# Patient Record
Sex: MALE | Race: WHITE | HISPANIC OR LATINO | Employment: UNEMPLOYED | URBAN - METROPOLITAN AREA
[De-identification: names, ages, dates, MRNs, and addresses within clinical notes are randomized per-mention and may not be internally consistent; named-entity substitution may affect disease eponyms.]

---

## 2024-11-15 ENCOUNTER — HOSPITAL ENCOUNTER (EMERGENCY)
Facility: HOSPITAL | Age: 19
Discharge: HOME | End: 2024-11-15
Attending: EMERGENCY MEDICINE
Payer: COMMERCIAL

## 2024-11-15 VITALS
OXYGEN SATURATION: 98 % | WEIGHT: 154.54 LBS | RESPIRATION RATE: 20 BRPM | DIASTOLIC BLOOD PRESSURE: 68 MMHG | TEMPERATURE: 100.3 F | BODY MASS INDEX: 25.75 KG/M2 | SYSTOLIC BLOOD PRESSURE: 138 MMHG | HEART RATE: 108 BPM | HEIGHT: 65 IN

## 2024-11-15 DIAGNOSIS — J06.9 URI (UPPER RESPIRATORY INFECTION): Primary | ICD-10-CM

## 2024-11-15 LAB — S PYO DNA THROAT QL NAA+PROBE: NOT DETECTED

## 2024-11-15 PROCEDURE — 87651 STREP A DNA AMP PROBE: CPT | Performed by: EMERGENCY MEDICINE

## 2024-11-15 PROCEDURE — 99283 EMERGENCY DEPT VISIT LOW MDM: CPT

## 2024-11-15 PROCEDURE — 99283 EMERGENCY DEPT VISIT LOW MDM: CPT | Performed by: EMERGENCY MEDICINE

## 2024-11-16 NOTE — ED PROVIDER NOTES
Time reflects when diagnosis was documented in both MDM as applicable and the Disposition within this note       Time User Action Codes Description Comment    11/15/2024 10:22 PM Garrett Good Add [J06.9] URI (upper respiratory infection)           ED Disposition       ED Disposition   Discharge    Condition   Stable    Date/Time   Fri Nov 15, 2024 10:22 PM    Comment   Olvin Penny discharge to home/self care.                   Assessment & Plan       Medical Decision Making  Patient is a 19-year-old male, comes in with complaints of sore throat, cough, bilateral ear pain, going on for 1 week, concerned about possible strep exposure as brother was sick with that, no fever, no trouble swallowing, no dyspnea, no chest pain.  On exam, patient is conscious, alert, well-appearing, no acute distress, low-grade temp, no increased work of breathing, lungs clear, no wheezing, no oropharyngeal exudates, no erythema of TMs.  Impression: URI, strep throat, will check strep swab, discussed with patient that is likely URI, symptoms going on for almost a week, no indication for viral/flu swab at this time, patient okay with that.    Problems Addressed:  URI (upper respiratory infection): acute illness or injury    Amount and/or Complexity of Data Reviewed  Labs: ordered. Decision-making details documented in ED Course.        ED Course as of 11/15/24 2341   Fri Nov 15, 2024   2213 STREP A PCR: Not Detected  Strep negative.       Medications - No data to display    ED Risk Strat Scores                           SBIRT 20yo+      Flowsheet Row Most Recent Value   Initial Alcohol Screen: US AUDIT-C     1. How often do you have a drink containing alcohol? 0 Filed at: 11/15/2024 2112   2. How many drinks containing alcohol do you have on a typical day you are drinking?  0 Filed at: 11/15/2024 2112   3a. Male UNDER 65: How often do you have five or more drinks on one occasion? 0 Filed at: 11/15/2024 2112   Audit-C Score 0 Filed at:  11/15/2024 2112   PRATIMA: How many times in the past year have you...    Used an illegal drug or used a prescription medication for non-medical reasons? Never Filed at: 11/15/2024 2112                            History of Present Illness       Chief Complaint   Patient presents with    Sore Throat     Pt c/o sore throat and a dry cough for the past week. Denies fever, N,V.  Pts little brother has strep throat.        History reviewed. No pertinent past medical history.   History reviewed. No pertinent surgical history.   History reviewed. No pertinent family history.       E-Cigarette/Vaping      E-Cigarette/Vaping Substances      I have reviewed and agree with the history as documented.       History provided by:  Patient   used: No    Sore Throat  Location:  Generalized  Quality:  Aching  Severity:  Mild  Onset quality:  Gradual  Duration:  1 week  Timing:  Intermittent  Progression:  Waxing and waning  Chronicity:  New  Relieved by:  Nothing  Worsened by:  Nothing  Ineffective treatments:  None tried  Associated symptoms: cough and ear pain    Associated symptoms: no abdominal pain, no adenopathy, no chest pain, no chills, no fever, no headaches, no neck stiffness, no rash, no shortness of breath and no trouble swallowing    Cough:     Cough characteristics:  Non-productive    Sputum characteristics:  Nondescript    Severity:  Moderate    Onset quality:  Gradual    Duration:  1 week    Timing:  Intermittent    Progression:  Waxing and waning  Risk factors: exposure to strep        Review of Systems   Constitutional:  Negative for chills and fever.   HENT:  Positive for ear pain and sore throat. Negative for facial swelling and trouble swallowing.    Eyes:  Negative for pain and visual disturbance.   Respiratory:  Positive for cough. Negative for chest tightness and shortness of breath.    Cardiovascular:  Negative for chest pain and leg swelling.   Gastrointestinal:  Negative for abdominal  pain, blood in stool, diarrhea, nausea and vomiting.   Genitourinary:  Negative for dysuria and flank pain.   Musculoskeletal:  Negative for back pain, neck pain and neck stiffness.   Skin:  Negative for pallor and rash.   Allergic/Immunologic: Negative for environmental allergies and immunocompromised state.   Neurological:  Negative for dizziness and headaches.   Hematological:  Negative for adenopathy. Does not bruise/bleed easily.   Psychiatric/Behavioral:  Negative for agitation and behavioral problems.    All other systems reviewed and are negative.          Objective       ED Triage Vitals [11/15/24 2056]   Temperature Pulse Blood Pressure Respirations SpO2 Patient Position - Orthostatic VS   100.3 °F (37.9 °C) (!) 108 138/68 20 98 % --      Temp Source Heart Rate Source BP Location FiO2 (%) Pain Score    Oral Monitor -- -- --      Vitals      Date and Time Temp Pulse SpO2 Resp BP Pain Score FACES Pain Rating User   11/15/24 2056 100.3 °F (37.9 °C) 108 98 % 20 138/68 -- -- CG            Physical Exam  Vitals and nursing note reviewed.   Constitutional:       General: He is not in acute distress.     Appearance: He is well-developed.   HENT:      Head: Normocephalic and atraumatic.      Right Ear: Tympanic membrane is not erythematous.      Left Ear: Tympanic membrane is not erythematous.      Mouth/Throat:      Mouth: Mucous membranes are moist.      Pharynx: Oropharynx is clear. No pharyngeal swelling, oropharyngeal exudate or posterior oropharyngeal erythema.   Eyes:      Extraocular Movements: Extraocular movements intact.   Cardiovascular:      Rate and Rhythm: Normal rate and regular rhythm.      Heart sounds: Normal heart sounds.   Pulmonary:      Effort: Pulmonary effort is normal.      Breath sounds: Normal breath sounds.   Abdominal:      Palpations: Abdomen is soft.      Tenderness: There is no abdominal tenderness. There is no guarding or rebound.   Musculoskeletal:         General: Normal range of  motion.      Cervical back: Normal range of motion and neck supple.   Skin:     General: Skin is warm and dry.   Neurological:      General: No focal deficit present.      Mental Status: He is alert and oriented to person, place, and time.   Psychiatric:         Mood and Affect: Mood normal.         Behavior: Behavior normal.         Results Reviewed       Procedure Component Value Units Date/Time    Strep A PCR [[id number]]  (Normal) Collected: 11/15/24 2106    Lab Status: Final result Specimen: Throat Updated: 11/15/24 2209     STREP A PCR Not Detected            No orders to display       Procedures    ED Medication and Procedure Management   None     There are no discharge medications for this patient.    No discharge procedures on file.  ED SEPSIS DOCUMENTATION   Time reflects when diagnosis was documented in both MDM as applicable and the Disposition within this note       Time User Action Codes Description Comment    11/15/2024 10:22 PM Garrett Good Add [J06.9] URI (upper respiratory infection)                  Garrett Good MD  11/15/24 1129

## 2024-12-23 ENCOUNTER — APPOINTMENT (EMERGENCY)
Dept: CT IMAGING | Facility: HOSPITAL | Age: 19
End: 2024-12-23
Payer: COMMERCIAL

## 2024-12-23 ENCOUNTER — HOSPITAL ENCOUNTER (EMERGENCY)
Facility: HOSPITAL | Age: 19
Discharge: HOME | End: 2024-12-23
Attending: INTERNAL MEDICINE
Payer: COMMERCIAL

## 2024-12-23 ENCOUNTER — APPOINTMENT (EMERGENCY)
Dept: RADIOLOGY | Facility: HOSPITAL | Age: 19
End: 2024-12-23
Payer: COMMERCIAL

## 2024-12-23 VITALS
TEMPERATURE: 98 F | WEIGHT: 147.27 LBS | BODY MASS INDEX: 24.51 KG/M2 | HEART RATE: 94 BPM | DIASTOLIC BLOOD PRESSURE: 64 MMHG | RESPIRATION RATE: 16 BRPM | SYSTOLIC BLOOD PRESSURE: 125 MMHG | OXYGEN SATURATION: 100 %

## 2024-12-23 DIAGNOSIS — R05.9 COUGH: Primary | ICD-10-CM

## 2024-12-23 DIAGNOSIS — R93.89 ABNORMAL CT OF THE CHEST: ICD-10-CM

## 2024-12-23 LAB
FLUAV AG UPPER RESP QL IA.RAPID: NEGATIVE
FLUBV AG UPPER RESP QL IA.RAPID: NEGATIVE
SARS-COV+SARS-COV-2 AG RESP QL IA.RAPID: NEGATIVE

## 2024-12-23 PROCEDURE — 99284 EMERGENCY DEPT VISIT MOD MDM: CPT | Performed by: INTERNAL MEDICINE

## 2024-12-23 PROCEDURE — 71250 CT THORAX DX C-: CPT

## 2024-12-23 PROCEDURE — 87811 SARS-COV-2 COVID19 W/OPTIC: CPT | Performed by: INTERNAL MEDICINE

## 2024-12-23 PROCEDURE — 87804 INFLUENZA ASSAY W/OPTIC: CPT | Performed by: INTERNAL MEDICINE

## 2024-12-23 PROCEDURE — 99284 EMERGENCY DEPT VISIT MOD MDM: CPT

## 2024-12-23 PROCEDURE — 71046 X-RAY EXAM CHEST 2 VIEWS: CPT

## 2024-12-23 RX ORDER — BENZONATATE 100 MG/1
100 CAPSULE ORAL EVERY 8 HOURS
Qty: 21 CAPSULE | Refills: 0 | Status: SHIPPED | OUTPATIENT
Start: 2024-12-23

## 2024-12-23 RX ORDER — FLUTICASONE PROPIONATE 50 MCG
1 SPRAY, SUSPENSION (ML) NASAL DAILY
Qty: 16 G | Refills: 0 | Status: SHIPPED | OUTPATIENT
Start: 2024-12-23

## 2024-12-23 RX ORDER — AZITHROMYCIN 250 MG/1
TABLET, FILM COATED ORAL
Qty: 6 TABLET | Refills: 0 | Status: SHIPPED | OUTPATIENT
Start: 2024-12-23 | End: 2024-12-27

## 2024-12-23 NOTE — Clinical Note
Olvin Penny was seen and treated in our emergency department on 12/23/2024.                Diagnosis:     Olvin  .    He may return on this date:          If you have any questions or concerns, please don't hesitate to call.      Neida Portillo MD    ______________________________           _______________          _______________  Hospital Representative                              Date                                Time

## 2024-12-23 NOTE — ED PROVIDER NOTES
Time reflects when diagnosis was documented in both MDM as applicable and the Disposition within this note       Time User Action Codes Description Comment    12/23/2024  3:18 PM LindseyJanie camachoa Add [R05.9] Cough     12/23/2024  3:19 PM Neida Portillo Add [R93.89] Abnormal CT of the chest           ED Disposition       ED Disposition   Discharge    Condition   Stable    Date/Time   Mon Dec 23, 2024  3:18 PM    Comment   Gurwindermaria guadalupe Zohaib discharge to home/self care.                   Assessment & Plan       Medical Decision Making  Discussed treatment options with this 19-year-old patient with continued cough and intermittent abdominal pain with deep coughing.  We discussed viral swabbing, labs and x-ray and CT.  Patient would like to have the x-ray of the chest.  And be treated with antibiotics for pneumonia.  I have low suspicion that this is a intra-abdominal process, as his abdomen is benign on exam and he has no abdominal pain except with deep coughing.      Chest x-ray showed abnormality, will order CT chest for better definition.    The patient (and any family present) verbalized understanding of the discharge instructions and warnings that would necessitate return to the Emergency Department.    All questions were answered prior to discharge.      Amount and/or Complexity of Data Reviewed  Labs: ordered.  Radiology: ordered.    Risk  Prescription drug management.        ED Course as of 12/23/24 1540   Mon Dec 23, 2024   1316 Chest x-ray abnormality noted, will order CT chest   1351 Radiology staff arrived and taking pt to CT scan   1519 I discussed patient with Dr. Lazar, cardiologist, who reviewed CT scan and believes this is a pericardial cyst, he recommends an echo sometime in the future.  This was discussed with the patient and his mom.  Ambulatory referral for cardiology placed       Medications - No data to display    ED Risk Strat Scores                          SBIRT 22yo+      Flowsheet Row Most  Recent Value   Initial Alcohol Screen: US AUDIT-C     1. How often do you have a drink containing alcohol? 0 Filed at: 12/23/2024 1212   2. How many drinks containing alcohol do you have on a typical day you are drinking?  0 Filed at: 12/23/2024 1212   3a. Male UNDER 65: How often do you have five or more drinks on one occasion? 0 Filed at: 12/23/2024 1212   Audit-C Score 0 Filed at: 12/23/2024 1212   PRATIMA: How many times in the past year have you...    Used an illegal drug or used a prescription medication for non-medical reasons? Never Filed at: 12/23/2024 1212                            History of Present Illness       Chief Complaint   Patient presents with    Abdominal Pain     Pt c/o abd pain and cough for the last few weeks- states he was seen here a couple of weeks ago and the cough is getting worse. Reports taking cough medicine at home.        History reviewed. No pertinent past medical history.   History reviewed. No pertinent surgical history.   History reviewed. No pertinent family history.   Social History     Tobacco Use    Smoking status: Never    Smokeless tobacco: Never   Substance Use Topics    Alcohol use: Never    Drug use: Never      E-Cigarette/Vaping      E-Cigarette/Vaping Substances      I have reviewed and agree with the history as documented.     19-year-old man presents to ED with his mom for evaluation of cough.  He reports cough for the last few weeks.  He states the cough gets better some days and then gets worse some days.  He reports a productive cough with clear to yellowish sputum.  He reports when he coughs hard, he intermittently gets abdominal pain.  He denies any nausea, vomiting, diarrhea melena or hematochezia.  Has been taking over-the-counter cough medicine with some relief.  Reports multiple sick contacts at home.  Reports he moved into a new home and is sleeping in the basement.  No fevers or chills.  No other complaints or concerns per patient or his  mom.        Abdominal Pain      Review of Systems   All other systems reviewed and are negative.          Objective       ED Triage Vitals [12/23/24 1210]   Temperature Pulse Blood Pressure Respirations SpO2 Patient Position - Orthostatic VS   98 °F (36.7 °C) 104 135/68 18 99 % Sitting      Temp Source Heart Rate Source BP Location FiO2 (%) Pain Score    Oral Monitor Right arm -- 3      Vitals      Date and Time Temp Pulse SpO2 Resp BP Pain Score FACES Pain Rating User   12/23/24 1413 -- 94 100 % 16 125/64 -- -- JT   12/23/24 1210 98 °F (36.7 °C) 104 99 % 18 135/68 3 -- LB            Physical Exam  PHYSICAL EXAM    Constitutional:  Well developed, no acute distress  HEENT:  Conjunctiva normal. Oropharynx moist  Respiratory:  No respiratory distress  Cardiovascular:  Normal rate  GI:  Soft, nondistended, nontender, Benign abdominal exam. No tenderness to palpation, both light and deep in all 4 quadrants.  :  No costovertebral angle tenderness   Musculoskeletal:  No edema, no tenderness, no deformities  Integument:  Well hydrated, no rash   Lymphatic:  No lymphadenopathy noted   Neurologic:  Alert & oriented x 3, normal motor function, no focal deficits noted   Psychiatric:  Speech and behavior appropriate         Results Reviewed       Procedure Component Value Units Date/Time    FLU/COVID Rapid Antigen (30 min. TAT) - Preferred screening test in ED [[id number]]  (Normal) Collected: 12/23/24 1243    Lab Status: Final result Specimen: Nares from Nose Updated: 12/23/24 1319     SARS COV Rapid Antigen Negative     Influenza A Rapid Antigen Negative     Influenza B Rapid Antigen Negative    Narrative:      This test has been performed using the Quidel Chantal 2 FLU+SARS Antigen test under the Emergency Use Authorization (EUA). This test has been validated by the  and verified by the performing laboratory. The Chantal uses lateral flow immunofluorescent sandwich assay to detect SARS-COV, Influenza A and Influenza  B Antigen.     The Quidel Chantal 2 SARS Antigen test does not differentiate between SARS-CoV and SARS-CoV-2.     Negative results are presumptive and may be confirmed with a molecular assay, if necessary, for patient management. Negative results do not rule out SARS-CoV-2 or influenza infection and should not be used as the sole basis for treatment or patient management decisions. A negative test result may occur if the level of antigen in a sample is below the limit of detection of this test.     Positive results are indicative of the presence of viral antigens, but do not rule out bacterial infection or co-infection with other viruses.     All test results should be used as an adjunct to clinical observations and other information available to the provider.    FOR PEDIATRIC PATIENTS - copy/paste COVID Guidelines URL to browser: [email or web address]            CT chest wo contrast   Final Interpretation by Roshan Greco MD (12/23 9354)      In the anterior mediastinum, there is a rim calcified soft tissue density structure measuring 4.7 x 2.6 cm inseparable from the pericardium likely represent a pericardial cyst. Old calcified hematoma also in the differential. Correlate for any prior    history of trauma.               Workstation performed: [id number]         XR chest 2 views   Final Interpretation by Roshan Greco MD (12/23 7045)      Rim calcified structure projecting along the anterior mediastinum along the cardiac border possibly pericardial cyst or old hematoma. See concurrent CT scan of the chest.            Workstation performed: [id number]             Procedures    ED Medication and Procedure Management   None     Discharge Medication List as of 12/23/2024  3:19 PM        START taking these medications    Details   azithromycin (ZITHROMAX) 250 mg tablet Take 2 tablets today then 1 tablet daily x 4 days, Normal      benzonatate (TESSALON PERLES) 100  mg capsule Take 1 capsule (100 mg total) by mouth every 8 (eight) hours, Starting Mon 12/23/2024, Normal      fluticasone (FLONASE) 50 mcg/act nasal spray 1 spray into each nostril daily, Starting Mon 12/23/2024, Normal             ED SEPSIS DOCUMENTATION   Time reflects when diagnosis was documented in both MDM as applicable and the Disposition within this note       Time User Action Codes Description Comment    12/23/2024  3:18 PM Neida Portillo Add [R05.9] Cough     12/23/2024  3:19 PM Neida Portillo Add [R93.89] Abnormal CT of the chest                  Neida Portillo MD  12/23/24 0244

## 2024-12-23 NOTE — DISCHARGE INSTRUCTIONS
CT chest wo contrast  Result Date: 12/23/2024  Impression: In the anterior mediastinum, there is a rim calcified soft tissue density structure measuring 4.7 x 2.6 cm inseparable from the pericardium likely represent a pericardial cyst. Old calcified hematoma also in the differential. Correlate for any prior history of trauma. Workstation performed: [id number]     XR chest 2 views  Result Date: 12/23/2024  Impression: Rim calcified structure projecting along the anterior mediastinum along the cardiac border possibly pericardial cyst or old hematoma. See concurrent CT scan of the chest. Workstation performed: [id number]

## 2025-01-21 ENCOUNTER — OFFICE VISIT (OUTPATIENT)
Dept: CARDIOLOGY CLINIC | Facility: CLINIC | Age: 20
End: 2025-01-21

## 2025-01-21 VITALS
BODY MASS INDEX: 25.29 KG/M2 | OXYGEN SATURATION: 97 % | SYSTOLIC BLOOD PRESSURE: 110 MMHG | HEART RATE: 77 BPM | DIASTOLIC BLOOD PRESSURE: 70 MMHG | WEIGHT: 152 LBS

## 2025-01-21 DIAGNOSIS — Q24.8 PERICARDIAL CYST: Primary | ICD-10-CM

## 2025-01-21 DIAGNOSIS — R93.89 ABNORMAL CT OF THE CHEST: ICD-10-CM

## 2025-01-21 PROCEDURE — 99243 OFF/OP CNSLTJ NEW/EST LOW 30: CPT | Performed by: INTERNAL MEDICINE

## 2025-01-21 PROCEDURE — 93000 ELECTROCARDIOGRAM COMPLETE: CPT | Performed by: INTERNAL MEDICINE

## 2025-01-21 NOTE — PROGRESS NOTES
Cardiology Follow Up    Olvin Penny  2005  [id number]  [facility] [place]  [address]  [place] [address] [phone]    1. Abnormal CT of the chest  -     Ambulatory Referral to Cardiology      Discussion: Probable pericardial cyst.  Most likely congenital.  MRI of the chest and and echocardiogram have been ordered. I will see him in 2 months.     Cardiovascular History:  There is no history of cardiac symptoms or disease.  He underwent a CT scan in 12/24 because of a persistent cough.  This disclosed the presence of a 4.7 x 2.6 cm rim of calcified soft tissue but could not be  from the pericardium in the anterior mediastinum.  There is no history of trauma.  He had a normal infancy and childhood.  His birth was noncomplicated.  He does not exercise formally, but denies any history of dyspnea with exertion or chest discomfort.  The URI symptoms that prompted his initial evaluation subsequently resolved.  At the time of his initial visit in 1/25, an echocardiogram to assess any evidence of impingement on the heart, and an MRI of the heart were ordered.  If these do not disclose any new information, the CT scan will be repeated in 6 months and then at 1 year for any increase in the size of the cyst.    There is no problem list on file for this patient.    No past medical history on file.  Social History     Socioeconomic History    Marital status: Single     Spouse name: Not on file    Number of children: Not on file    Years of education: Not on file    Highest education level: Not on file   Occupational History    Not on file   Tobacco Use    Smoking status: Never    Smokeless tobacco: Never   Substance and Sexual Activity    Alcohol use: Never    Drug use: Never    Sexual activity: Not on file   Other Topics Concern    Not on file   Social History Narrative    Not on file     Social Drivers of Health     Financial  Resource Strain: Not on file   Food Insecurity: Not on file   Transportation Needs: Not on file   Physical Activity: Not on file   Stress: Not on file   Social Connections: Not on file   Intimate Partner Violence: Not on file   Housing Stability: Not on file      No family history on file.  No past surgical history on file.    Current Outpatient Medications:     benzonatate (TESSALON PERLES) 100 mg capsule, Take 1 capsule (100 mg total) by mouth every 8 (eight) hours, Disp: 21 capsule, Rfl: 0    fluticasone (FLONASE) 50 mcg/act nasal spray, 1 spray into each nostril daily, Disp: 16 g, Rfl: 0  No Known Allergies    Labs: personally reviewed all pertinent labs  Imaging:  personally reviewed all pertinent imaging  Cath:  ECHO:  Stress:  Holter:    Review of Systems:  Review of Systems   Constitutional: Negative.   HENT: Negative.     Eyes: Negative.    Cardiovascular: Negative.    Respiratory: Negative.     Endocrine: Negative.    Hematologic/Lymphatic: Negative.    Skin: Negative.    Musculoskeletal: Negative.    Gastrointestinal: Negative.    Genitourinary: Negative.    Neurological: Negative.    Psychiatric/Behavioral: Negative.     Allergic/Immunologic: Negative.    All other systems reviewed and are negative.      There were no vitals filed for this visit.  Weight (last 2 days)       None            Physical Exam:  Physical Exam  Vitals reviewed.   Constitutional:       General: He is not in acute distress.     Appearance: He is well-developed. He is not diaphoretic.   HENT:      Head: Normocephalic and atraumatic.   Eyes:      General: No scleral icterus.     Conjunctiva/sclera: Conjunctivae normal.   Neck:      Vascular: No JVD.      Trachea: No tracheal deviation.   Cardiovascular:      Rate and Rhythm: Normal rate and regular rhythm.      Pulses: Intact distal pulses.      Heart sounds: Normal heart sounds. No murmur heard.     No friction rub. No gallop.   Pulmonary:      Effort: Pulmonary effort is normal.  No respiratory distress.      Breath sounds: Normal breath sounds. No stridor. No wheezing or rales.   Chest:      Chest wall: No tenderness.   Abdominal:      General: Bowel sounds are normal. There is no distension.      Palpations: Abdomen is soft.      Tenderness: There is no abdominal tenderness.   Musculoskeletal:         General: No tenderness. Normal range of motion.      Cervical back: Normal range of motion and neck supple.   Skin:     General: Skin is warm and dry.      Findings: No erythema.   Neurological:      Mental Status: He is alert and oriented to person, place, and time.      Cranial Nerves: No cranial nerve deficit.      Coordination: Coordination normal.   Psychiatric:         Behavior: Behavior normal.         Thought Content: Thought content normal.         Judgment: Judgment normal.         Juan Rene MD

## 2025-02-06 ENCOUNTER — HOSPITAL ENCOUNTER (OUTPATIENT)
Dept: NON INVASIVE DIAGNOSTICS | Facility: HOSPITAL | Age: 20
Discharge: HOME | End: 2025-02-06
Attending: INTERNAL MEDICINE
Payer: COMMERCIAL

## 2025-02-06 VITALS
HEIGHT: 65 IN | HEART RATE: 79 BPM | WEIGHT: 152 LBS | SYSTOLIC BLOOD PRESSURE: 118 MMHG | DIASTOLIC BLOOD PRESSURE: 55 MMHG | BODY MASS INDEX: 25.33 KG/M2

## 2025-02-06 DIAGNOSIS — R93.89 ABNORMAL CT OF THE CHEST: ICD-10-CM

## 2025-02-06 LAB
AORTIC ROOT: 2.5 CM
AORTIC VALVE MEAN VELOCITY: 8.1 M/S
ASCENDING AORTA: 2.4 CM
AV AREA BY CONTINUOUS VTI: 2.1 CM2
AV AREA PEAK VELOCITY: 2 CM2
AV LVOT MEAN GRADIENT: 2 MMHG
AV LVOT PEAK GRADIENT: 4 MMHG
AV MEAN PRESS GRAD SYS DOP V1V2: 3 MMHG
AV ORIFICE AREA US: 2.13 CM2
AV PEAK GRADIENT: 6 MMHG
AV VELOCITY RATIO: 0.84
AV VMAX SYS DOP: 1.17 M/S
BSA FOR ECHO PROCEDURE: 1.76 M2
DOP CALC AO VTI: 22.23 CM
DOP CALC LVOT AREA: 2.54 CM2
DOP CALC LVOT CARDIAC INDEX: 2.06 L/MIN/M2
DOP CALC LVOT CARDIAC OUTPUT: 3.62 L/MIN
DOP CALC LVOT DIAMETER: 1.8 CM
DOP CALC LVOT PEAK VEL VTI: 18.58 CM
DOP CALC LVOT PEAK VEL: 0.93 M/S
DOP CALC LVOT STROKE INDEX: 27.3 ML/M2
DOP CALC LVOT STROKE VOLUME: 47.26
E WAVE DECELERATION TIME: 225 MS
E/A RATIO: 1.39
FRACTIONAL SHORTENING: 40 (ref 28–44)
INTERVENTRICULAR SEPTUM IN DIASTOLE (PARASTERNAL SHORT AXIS VIEW): 0.7 CM
INTERVENTRICULAR SEPTUM: 0.7 CM (ref 0.6–1.1)
LAAS-AP2: 13 CM2
LAAS-AP4: 13.8 CM2
LEFT ATRIUM SIZE: 2.9 CM
LEFT ATRIUM VOLUME (MOD BIPLANE): 32 ML
LEFT ATRIUM VOLUME INDEX (MOD BIPLANE): 18.2 ML/M2
LEFT INTERNAL DIMENSION IN SYSTOLE: 2.6 CM (ref 2.1–4)
LEFT VENTRICULAR INTERNAL DIMENSION IN DIASTOLE: 4.3 CM (ref 3.5–6)
LEFT VENTRICULAR POSTERIOR WALL IN END DIASTOLE: 0.7 CM
LEFT VENTRICULAR STROKE VOLUME: 57 ML
LV EF US.2D.A4C+ESTIMATED: 69 %
LVSV (TEICH): 57 ML
MV E'TISSUE VEL-LAT: 19 CM/S
MV E'TISSUE VEL-SEP: 16 CM/S
MV PEAK A VEL: 0.49 M/S
MV PEAK E VEL: 68 CM/S
RIGHT ATRIAL 2D VOLUME: 40 ML
RIGHT ATRIUM AREA SYSTOLE A4C: 16.1 CM2
RIGHT VENTRICLE ID DIMENSION: 3 CM
SL CV LEFT ATRIUM LENGTH A2C: 4.5 CM
SL CV PED ECHO LEFT VENTRICLE DIASTOLIC VOLUME (MOD BIPLANE) 2D: 82 ML
SL CV PED ECHO LEFT VENTRICLE SYSTOLIC VOLUME (MOD BIPLANE) 2D: 25 ML
TRICUSPID ANNULAR PLANE SYSTOLIC EXCURSION: 1.7 CM

## 2025-02-06 PROCEDURE — 93306 TTE W/DOPPLER COMPLETE: CPT | Performed by: INTERNAL MEDICINE

## 2025-02-06 PROCEDURE — 93306 TTE W/DOPPLER COMPLETE: CPT

## 2025-03-19 DIAGNOSIS — Q24.8 PERICARDIAL CYST: Primary | ICD-10-CM

## 2025-03-27 ENCOUNTER — HOSPITAL ENCOUNTER (OUTPATIENT)
Dept: RADIOLOGY | Facility: HOSPITAL | Age: 20
Discharge: HOME | End: 2025-03-27
Attending: INTERNAL MEDICINE
Payer: COMMERCIAL

## 2025-03-27 DIAGNOSIS — R93.89 ABNORMAL CT OF THE CHEST: ICD-10-CM

## 2025-03-27 DIAGNOSIS — Q24.8 PERICARDIAL CYST: ICD-10-CM

## 2025-03-27 PROCEDURE — A9585 GADOBUTROL INJECTION: HCPCS | Performed by: INTERNAL MEDICINE

## 2025-03-27 PROCEDURE — 75561 CARDIAC MRI FOR MORPH W/DYE: CPT

## 2025-03-27 RX ORDER — GADOBUTROL 604.72 MG/ML
12 INJECTION INTRAVENOUS
Status: COMPLETED | OUTPATIENT
Start: 2025-03-27 | End: 2025-03-27

## 2025-03-27 RX ADMIN — GADOBUTROL 12 ML: 604.72 INJECTION INTRAVENOUS at 19:11

## 2025-04-17 ENCOUNTER — OFFICE VISIT (OUTPATIENT)
Dept: CARDIOLOGY CLINIC | Facility: CLINIC | Age: 20
End: 2025-04-17
Payer: COMMERCIAL

## 2025-04-17 VITALS
SYSTOLIC BLOOD PRESSURE: 112 MMHG | BODY MASS INDEX: 27.22 KG/M2 | DIASTOLIC BLOOD PRESSURE: 68 MMHG | OXYGEN SATURATION: 99 % | WEIGHT: 163.4 LBS | HEART RATE: 78 BPM | HEIGHT: 65 IN

## 2025-04-17 DIAGNOSIS — E32.8 THYMIC CYST (HCC): Primary | ICD-10-CM

## 2025-04-17 DIAGNOSIS — R93.89 ABNORMAL CT OF THE CHEST: ICD-10-CM

## 2025-04-17 PROCEDURE — 99214 OFFICE O/P EST MOD 30 MIN: CPT | Performed by: INTERNAL MEDICINE

## 2025-04-17 NOTE — PROGRESS NOTES
Cardiology Follow Up    Olvin Penny  2005  [id number]  [facility] [place]  [address]  [place] [address] [phone]        Discussion: Echo was normal. CMR disclosed an  anterior mediastinal lesion measuring 4.4 cm with features suggesting a complicated thymic cyst. Complicated pericardial cyst was also possible. 3 to 6-month follow-up mediastinal MRI with and without contrast was recommended. Mr. Penny was referred to thoracic surgery for follow-up.     Cardiovascular History:  There is no history of cardiac symptoms or disease.  He underwent a CT scan in 12/24 because of a persistent cough.  This disclosed the presence of a 4.7 x 2.6 cm rim of calcified soft tissue but could not be  from the pericardium in the anterior mediastinum.  There is no history of trauma.  He had a normal infancy and childhood.  His birth was noncomplicated.  He does not exercise formally, but denies any history of dyspnea with exertion or chest discomfort.  The URI symptoms that prompted his initial evaluation subsequently resolved.  At the time of his initial visit in 1/25, an echocardiogram to assess any evidence of impingement on the heart, and an MRI of the heart were ordered. Echo showed no abnormalities. The EF was 65. MRI of the heart showed no cardiac abnormalities. An  anterior mediastinal lesion measuring 4.4 cm with features suggesting a complicated thymic cyst was again idientified. Complicated pericardial cyst was also possible. 3 to 6-month follow-up mediastinal MRI with and without contrast was recommended, which will have more standard T1 and T2 weighted images, and may provide better characterization compared to cardiac MRI. Mr. Penny was referred to thoracic surgery for follow-up.       Patient Active Problem List   Diagnosis    Pericardial cyst    Abnormal CT of the chest     No past medical history on file.  Social  History     Socioeconomic History    Marital status: Single     Spouse name: Not on file    Number of children: Not on file    Years of education: Not on file    Highest education level: Not on file   Occupational History    Not on file   Tobacco Use    Smoking status: Never    Smokeless tobacco: Never   Substance and Sexual Activity    Alcohol use: Never    Drug use: Never    Sexual activity: Not on file   Other Topics Concern    Not on file   Social History Narrative    Not on file     Social Drivers of Health     Financial Resource Strain: Not on file   Food Insecurity: Not on file   Transportation Needs: Not on file   Physical Activity: Not on file   Stress: Not on file   Social Connections: Not on file   Intimate Partner Violence: Not on file   Housing Stability: Not on file      No family history on file.  No past surgical history on file.    Current Outpatient Medications:     benzonatate (TESSALON PERLES) 100 mg capsule, Take 1 capsule (100 mg total) by mouth every 8 (eight) hours (Patient not taking: Reported on 4/17/2025), Disp: 21 capsule, Rfl: 0    fluticasone (FLONASE) 50 mcg/act nasal spray, 1 spray into each nostril daily (Patient not taking: Reported on 4/17/2025), Disp: 16 g, Rfl: 0  No Known Allergies    Labs: personally reviewed all pertinent labs  Imaging:  personally reviewed all pertinent imaging  Cath:  ECHO:  Stress:  Holter:    Review of Systems:  Review of Systems   Constitutional: Negative.   HENT: Negative.     Eyes: Negative.    Cardiovascular: Negative.    Respiratory: Negative.     Endocrine: Negative.    Hematologic/Lymphatic: Negative.    Skin: Negative.    Musculoskeletal: Negative.    Gastrointestinal: Negative.    Genitourinary: Negative.    Neurological: Negative.    Psychiatric/Behavioral: Negative.     Allergic/Immunologic: Negative.    All other systems reviewed and are negative.      Vitals:    04/17/25 0929   BP: 112/68   Pulse: 78   SpO2: 99%     Weight (last 2 days)        Date/Time Weight    04/17/25 0929 74.1 (163.4)            Physical Exam:  Physical Exam  Vitals reviewed.   Constitutional:       General: He is not in acute distress.     Appearance: He is well-developed. He is not diaphoretic.   HENT:      Head: Normocephalic and atraumatic.   Eyes:      General: No scleral icterus.     Conjunctiva/sclera: Conjunctivae normal.   Neck:      Vascular: No JVD.      Trachea: No tracheal deviation.   Cardiovascular:      Rate and Rhythm: Normal rate and regular rhythm.      Pulses: Intact distal pulses.      Heart sounds: Normal heart sounds. No murmur heard.     No friction rub. No gallop.   Pulmonary:      Effort: Pulmonary effort is normal. No respiratory distress.      Breath sounds: Normal breath sounds. No stridor. No wheezing or rales.   Chest:      Chest wall: No tenderness.   Abdominal:      General: Bowel sounds are normal. There is no distension.      Palpations: Abdomen is soft.      Tenderness: There is no abdominal tenderness.   Musculoskeletal:         General: No tenderness. Normal range of motion.      Cervical back: Normal range of motion and neck supple.   Skin:     General: Skin is warm and dry.      Findings: No erythema.   Neurological:      Mental Status: He is alert and oriented to person, place, and time.      Cranial Nerves: No cranial nerve deficit.      Coordination: Coordination normal.   Psychiatric:         Behavior: Behavior normal.         Thought Content: Thought content normal.         Judgment: Judgment normal.         Juan Rene MD